# Patient Record
Sex: FEMALE | Race: WHITE | URBAN - METROPOLITAN AREA
[De-identification: names, ages, dates, MRNs, and addresses within clinical notes are randomized per-mention and may not be internally consistent; named-entity substitution may affect disease eponyms.]

---

## 2020-03-11 ENCOUNTER — EMERGENCY (EMERGENCY)
Facility: HOSPITAL | Age: 20
LOS: 1 days | Discharge: HOME | End: 2020-03-11
Admitting: EMERGENCY MEDICINE
Payer: COMMERCIAL

## 2020-03-11 VITALS
HEIGHT: 63 IN | SYSTOLIC BLOOD PRESSURE: 126 MMHG | RESPIRATION RATE: 18 BRPM | TEMPERATURE: 98 F | HEART RATE: 92 BPM | DIASTOLIC BLOOD PRESSURE: 88 MMHG | OXYGEN SATURATION: 98 % | WEIGHT: 149.91 LBS

## 2020-03-11 VITALS
SYSTOLIC BLOOD PRESSURE: 119 MMHG | HEART RATE: 100 BPM | RESPIRATION RATE: 16 BRPM | TEMPERATURE: 98 F | DIASTOLIC BLOOD PRESSURE: 81 MMHG | OXYGEN SATURATION: 97 %

## 2020-03-11 DIAGNOSIS — R05 COUGH: ICD-10-CM

## 2020-03-11 DIAGNOSIS — R50.9 FEVER, UNSPECIFIED: ICD-10-CM

## 2020-03-11 PROBLEM — Z00.00 ENCOUNTER FOR PREVENTIVE HEALTH EXAMINATION: Status: ACTIVE | Noted: 2020-03-11

## 2020-03-11 LAB — HCG UR QL: NEGATIVE — SIGNIFICANT CHANGE UP

## 2020-03-11 PROCEDURE — 99284 EMERGENCY DEPT VISIT MOD MDM: CPT | Mod: 25

## 2020-03-11 PROCEDURE — 71046 X-RAY EXAM CHEST 2 VIEWS: CPT | Mod: 26

## 2020-03-11 RX ORDER — ALBUTEROL 90 UG/1
2.5 AEROSOL, METERED ORAL
Refills: 0 | Status: COMPLETED | OUTPATIENT
Start: 2020-03-11 | End: 2020-03-11

## 2020-03-11 RX ADMIN — ALBUTEROL 2.5 MILLIGRAM(S): 90 AEROSOL, METERED ORAL at 12:00

## 2020-03-11 RX ADMIN — ALBUTEROL 2.5 MILLIGRAM(S): 90 AEROSOL, METERED ORAL at 12:18

## 2020-03-11 RX ADMIN — Medication 60 MILLIGRAM(S): at 12:19

## 2020-03-11 NOTE — ED PROVIDER NOTE - OBJECTIVE STATEMENT
Pt. with h/o bronchitis yearly  on Symbicort and albuterol which she takes when she has a cold. Pt. now  c/o dry cough X 2 wks, albuterol not really helping, cough has been worse over the past 3 days. + runny nose( chronic) sore throat.  + subjected fever,  + chills.  taking Tylenol. Otherwise no n/v/d/c/, no rash, no recent travel, no Ua sx. pt. seen by a pulmonologist  and ent last year in [place], unclear of dx. following work -up. non- smoker.

## 2020-03-11 NOTE — ED PROVIDER NOTE - NSFOLLOWUPINSTRUCTIONS_ED_ALL_ED_FT
Chronic Bronchitis, Adult  Chronic bronchitis is long-lasting inflammation of the tubes that carry air into your lungs (bronchial tubes). This is inflammation that occurs:  On most days of the week.For at least three months at a time.Over a period of two years in a row.When the bronchial tubes are inflamed, they start to produce mucus. The inflammation and buildup of mucus make it more difficult to breathe. Chronic bronchitis is usually a permanent problem. It is one type of chronic obstructive pulmonary disease (COPD). People with chronic bronchitis are more likely to get frequent colds or respiratory infections.  What are the causes?  Chronic bronchitis most often occurs in people who:  Have chronic, severe asthma.Have a history of smoking.Have asthma and smoke.Have certain lung diseases.Have had long-term exposure to certain irritating fumes or chemicals.What are the signs or symptoms?  Symptoms of chronic bronchitis may include:  A cough that brings up mucus (productive cough).Shortness of breath.Loud breathing (wheezing).Chest discomfort.Frequent (recurring) colds or respiratory infections.Certain things can trigger chronic bronchitis symptoms or make them worse, such as:  Infections.Stopping certain medicines.Smoking.Exposure to chemicals.How is this diagnosed?  This condition may be diagnosed based on:  Your symptoms and medical history.A physical exam.A chest X-ray.Lung (pulmonary) function tests.How is this treated?  There is no cure for chronic bronchitis, but treatment can help control your symptoms. Treatment may include:  Using a cool mist vaporizer or humidifier to make it easier to breathe.Drinking more fluids. Drinking more makes your mucus thinner, which may make it easier to breathe.Lifestyle changes, such as eating a healthier diet and getting more exercise.Medicines, such as:  Inhalers to improve air flow in and out of your lungs.Antibiotics to treat any bacterial infections you have, such as:  Lung infection (pneumonia).Sinus infection.A sudden, severe (acute) episode of bronchitis.Oxygen therapy.Preventing infections by keeping up to date on vaccinations, including the pneumonia and flu vaccines.Pulmonary rehabilitation. This is a program that helps you manage your breathing problems and improve your quality of life. It may last for up to 4–12 weeks and may include exercise programs, education, counseling, and treatment support.Follow these instructions at home:  Medicines     Take over-the-counter and prescription medicines only as told by your health care provider.If you were prescribed an antibiotic medicine, take it as told by your health care provider. Do not stop taking the antibiotic even if you start to feel better.Preventing infections     Get vaccinations as told by your health care provider. Make sure you get a flu shot (influenza vaccine) every year.Wash your hands often with soap and water. If soap and water are not available, use hand . Avoid contact with people who have symptoms of a cold or the flu.Managing symptoms        Do not smoke, and avoid secondhand smoke. Exposure to cigarette smoke or irritating chemicals will make bronchitis worse. If you smoke and you need help quitting, ask your health care provider. Quitting smoking will help your lungs heal faster. Use an inhaler, cool mist vaporizer, or humidifier as told by your health care provider.Avoid pollen, dust, animal dander, molds, smoke, and other things that cause shortness of breath or wheezing attacks.Use oxygen therapy at home as directed. Follow instructions from your health care provider about how to use oxygen safely and take precautions to prevent fire. Make sure you never smoke while using oxygen or allow others to smoke in your home.Do not wait to get medical care if you have any concerning symptoms or trouble breathing. Waiting could cause permanent injury and may be life threatening.General instructions     Talk with your health care provider about what activities are safe for you and about possible exercise routines. Regular exercise is very important to help you feel better.Drink enough fluids to keep your urine pale yellow. Keep all follow-up visits as told by your health care provider. This is important.Contact a health care provider if:  You have coughing or shortness of breath that gets worse.You have muscle aches.You have chest pain.Your mucus seems to get thicker.Your mucus changes from clear or white to yellow, green, gray, or bloody.Get help right away if:  Your usual medicines do not stop your wheezing.You have severe difficulty breathing.These symptoms may represent a serious problem that is an emergency. Do not wait to see if the symptoms will go away. Get medical help right away. Call your local emergency services (911 in the [place] Do not drive yourself to the hospital.   Summary  Chronic bronchitis is long-lasting inflammation of the tubes that carry air into your lungs (bronchial tubes).Chronic bronchitis is usually a permanent problem. It is one type of chronic obstructive pulmonary disease (COPD).There is no cure for chronic bronchitis, but treatment can help control your symptoms.Do not smoke, and avoid secondhand smoke. Exposure to cigarette smoke or irritating chemicals will make bronchitis worse.This information is not intended to replace advice given to you by your health care provider. Make sure you discuss any questions you have with your health care provider. Chronic Bronchitis, Adult      Follow up with ENT and Pulmonology as scheduled.     Chronic bronchitis is long-lasting inflammation of the tubes that carry air into your lungs (bronchial tubes). This is inflammation that occurs:  On most days of the week.For at least three months at a time.Over a period of two years in a row.When the bronchial tubes are inflamed, they start to produce mucus. The inflammation and buildup of mucus make it more difficult to breathe. Chronic bronchitis is usually a permanent problem. It is one type of chronic obstructive pulmonary disease (COPD). People with chronic bronchitis are more likely to get frequent colds or respiratory infections.  What are the causes?  Chronic bronchitis most often occurs in people who:  Have chronic, severe asthma.Have a history of smoking.Have asthma and smoke.Have certain lung diseases.Have had long-term exposure to certain irritating fumes or chemicals.What are the signs or symptoms?  Symptoms of chronic bronchitis may include:  A cough that brings up mucus (productive cough).Shortness of breath.Loud breathing (wheezing).Chest discomfort.Frequent (recurring) colds or respiratory infections.Certain things can trigger chronic bronchitis symptoms or make them worse, such as:  Infections.Stopping certain medicines.Smoking.Exposure to chemicals.How is this diagnosed?  This condition may be diagnosed based on:  Your symptoms and medical history.A physical exam.A chest X-ray.Lung (pulmonary) function tests.How is this treated?  There is no cure for chronic bronchitis, but treatment can help control your symptoms. Treatment may include:  Using a cool mist vaporizer or humidifier to make it easier to breathe.Drinking more fluids. Drinking more makes your mucus thinner, which may make it easier to breathe.Lifestyle changes, such as eating a healthier diet and getting more exercise.Medicines, such as:  Inhalers to improve air flow in and out of your lungs.Antibiotics to treat any bacterial infections you have, such as:  Lung infection (pneumonia).Sinus infection.A sudden, severe (acute) episode of bronchitis.Oxygen therapy.Preventing infections by keeping up to date on vaccinations, including the pneumonia and flu vaccines.Pulmonary rehabilitation. This is a program that helps you manage your breathing problems and improve your quality of life. It may last for up to 4–12 weeks and may include exercise programs, education, counseling, and treatment support.Follow these instructions at home:  Medicines     Take over-the-counter and prescription medicines only as told by your health care provider.If you were prescribed an antibiotic medicine, take it as told by your health care provider. Do not stop taking the antibiotic even if you start to feel better.Preventing infections     Get vaccinations as told by your health care provider. Make sure you get a flu shot (influenza vaccine) every year.Wash your hands often with soap and water. If soap and water are not available, use hand . Avoid contact with people who have symptoms of a cold or the flu.Managing symptoms        Do not smoke, and avoid secondhand smoke. Exposure to cigarette smoke or irritating chemicals will make bronchitis worse. If you smoke and you need help quitting, ask your health care provider. Quitting smoking will help your lungs heal faster. Use an inhaler, cool mist vaporizer, or humidifier as told by your health care provider.Avoid pollen, dust, animal dander, molds, smoke, and other things that cause shortness of breath or wheezing attacks.Use oxygen therapy at home as directed. Follow instructions from your health care provider about how to use oxygen safely and take precautions to prevent fire. Make sure you never smoke while using oxygen or allow others to smoke in your home.Do not wait to get medical care if you have any concerning symptoms or trouble breathing. Waiting could cause permanent injury and may be life threatening.General instructions     Talk with your health care provider about what activities are safe for you and about possible exercise routines. Regular exercise is very important to help you feel better.Drink enough fluids to keep your urine pale yellow. Keep all follow-up visits as told by your health care provider. This is important.Contact a health care provider if:  You have coughing or shortness of breath that gets worse.You have muscle aches.You have chest pain.Your mucus seems to get thicker.Your mucus changes from clear or white to yellow, green, gray, or bloody.Get help right away if:  Your usual medicines do not stop your wheezing.You have severe difficulty breathing.These symptoms may represent a serious problem that is an emergency. Do not wait to see if the symptoms will go away. Get medical help right away. Call your local emergency services (911 in the [place] Do not drive yourself to the hospital.   Summary  Chronic bronchitis is long-lasting inflammation of the tubes that carry air into your lungs (bronchial tubes).Chronic bronchitis is usually a permanent problem. It is one type of chronic obstructive pulmonary disease (COPD).There is no cure for chronic bronchitis, but treatment can help control your symptoms.Do not smoke, and avoid secondhand smoke. Exposure to cigarette smoke or irritating chemicals will make bronchitis worse.This information is not intended to replace advice given to you by your health care provider. Make sure you discuss any questions you have with your health care provider.

## 2020-03-11 NOTE — ED ADULT TRIAGE NOTE - CHIEF COMPLAINT QUOTE
[facility] from [facility] for further evaluation of asthma exacerbation x 2 weeks with cough and subjective fevers. was given 2 albuterol tx PTA without relief. +audible wheezing. +O2 sat WNL. took 3 tablets of Tylenol at 6am.

## 2020-03-11 NOTE — ED PROVIDER NOTE - PATIENT PORTAL LINK FT
You can access the FollowMyHealth Patient Portal offered by [facility] by registering at the following website: [email or web address]. By joining [facility]’s FollowMyHealth portal, you will also be able to view your health information using other applications (apps) compatible with our system.

## 2020-03-11 NOTE — ED PROVIDER NOTE - DIAGNOSTIC INTERPRETATION
Interpreted by [facility] chest x-ray, 2 views  Lungs clear, heart shadow normal, bony structures normal, no free air under diaphragm, no PTX

## 2020-03-11 NOTE — ED PROVIDER NOTE - CLINICAL SUMMARY MEDICAL DECISION MAKING FREE TEXT BOX
Pt. with h/o bronchitis yearly  on Symbicort and albuterol which she takes when she has a cold. Pt. now  c/o dry cough X 2 wks, albuterol not really helping, cough has been worse over the past 3 days. + runny nose( chronic) sore throat.  + subjected fever,  + chills.  vss,  wheezing improved after tx during exam. non- toxic appearing, vss, will do chest x- ray , prednisone. re-assess.

## 2020-03-11 NOTE — ED PROVIDER NOTE - ENMT, MLM
Airway patent, Nasal mucosa clear. Mouth with normal mucosa. Throat has no vesicles, no oropharyngeal exudates and uvula is midline TM wnl b/l

## 2020-03-11 NOTE — ED ADULT TRIAGE NOTE - PAIN: PRESENCE, MLM
Partially impaired: cannot see medication labels or newsprint, but can see obstacles in path, and the surrounding layout; can count fingers at arm's length denies pain/discomfort

## 2020-03-12 ENCOUNTER — APPOINTMENT (OUTPATIENT)
Dept: PULMONOLOGY | Facility: CLINIC | Age: 20
End: 2020-03-12
Payer: COMMERCIAL

## 2020-03-12 VITALS — SYSTOLIC BLOOD PRESSURE: 134 MMHG | OXYGEN SATURATION: 99 % | DIASTOLIC BLOOD PRESSURE: 85 MMHG | HEART RATE: 106 BPM

## 2020-03-12 DIAGNOSIS — Z87.891 PERSONAL HISTORY OF NICOTINE DEPENDENCE: ICD-10-CM

## 2020-03-12 DIAGNOSIS — Z83.6 FAMILY HISTORY OF OTHER DISEASES OF THE RESPIRATORY SYSTEM: ICD-10-CM

## 2020-03-12 DIAGNOSIS — K21.9 GASTRO-ESOPHAGEAL REFLUX DISEASE W/OUT ESOPHAGITIS: ICD-10-CM

## 2020-03-12 PROCEDURE — 94060 EVALUATION OF WHEEZING: CPT

## 2020-03-12 PROCEDURE — 94729 DIFFUSING CAPACITY: CPT

## 2020-03-12 PROCEDURE — 94726 PLETHYSMOGRAPHY LUNG VOLUMES: CPT

## 2020-03-12 PROCEDURE — 99204 OFFICE O/P NEW MOD 45 MIN: CPT | Mod: 25

## 2020-03-12 PROCEDURE — ZZZZZ: CPT

## 2020-03-12 RX ORDER — PANTOPRAZOLE 40 MG/1
40 TABLET, DELAYED RELEASE ORAL DAILY
Qty: 45 | Refills: 2 | Status: ACTIVE | COMMUNITY
Start: 2020-03-12 | End: 1900-01-01

## 2020-03-12 RX ORDER — FLUTICASONE PROPIONATE 50 UG/1
50 SPRAY, METERED NASAL TWICE DAILY
Qty: 1 | Refills: 2 | Status: ACTIVE | COMMUNITY
Start: 2020-03-12 | End: 1900-01-01

## 2020-03-12 RX ORDER — ALBUTEROL SULFATE 90 UG/1
108 (90 BASE) AEROSOL, METERED RESPIRATORY (INHALATION)
Refills: 0 | Status: ACTIVE | COMMUNITY

## 2020-03-12 NOTE — HISTORY OF PRESENT ILLNESS
[TextBox_4] : 19 year old female, ex-smoker (smoked for 10 years, quit 2 weeks back) with h/o asthma for last 3 years with deviated nasal septum and chronic allergic rhinitis came to clinic for management of asthma.\par Patient was started on Symbicort 3 years back but patient felt that her symptoms were not controlled with Symbicort and hence she stopped using Symbicort and uses prn Ventolin as needed. Patient moved to [facility] 1 year back and  had multiple episodes of wheezing and SOB since her move to [facility]. Patient went to ED few days back and diagnosed with asthma exacerbation. Patient was started on prednisone and feels better but still c/o nasal congestion, cough and SOB. CXR done in ED did not show pneumonia (as per patient)

## 2020-03-12 NOTE — DISCUSSION/SUMMARY
[FreeTextEntry1] : 19 year old female, ex-smoker (smoked for 10 years, quit 2 weeks back) with h/o asthma for last 3 years with deviated nasal septum and chronic allergic rhinitis came to clinic for management of asthma.\par \par Review:\par PFT (3/20): FEV1 77, FEV1/FVC 66, TLC 96, DLCO 79, Rev -4%\par \par A/P\par Severe Persistent asthma:\par - D/c prednisone after 3 more days \par - Start Breo 1 puff daily\par - Prn Ventolin\par - Singulair and Flonase nasal spray\par - Patient does not want to use nasal irrigation at present\par - Pantoprazole 40 mg daily

## 2020-03-12 NOTE — REVIEW OF SYSTEMS
[Nasal Congestion] : nasal congestion [Postnasal Drip] : postnasal drip [Cough] : cough [Nasal Discharge] : nasal discharge [GERD] : gerd [Fever] : no fever [Chills] : no chills [Dry Eyes] : no dry eyes [Eye Irritation] : no eye irritation [Hemoptysis] : no hemoptysis [Sputum] : no sputum [Hay Fever] : no hay fever [Abdominal Pain] : no abdominal pain [Nausea] : no nausea [Diarrhea] : no diarrhea [Depression] : no depression [Anxiety] : no anxiety [Diabetes] : no diabetes [Thyroid Problem] : no thyroid problem [TextBox_144] : rest of ROS negative

## 2020-03-12 NOTE — PHYSICAL EXAM
[No Acute Distress] : no acute distress [Well Developed] : well developed [Normal Oropharynx] : normal oropharynx [Erythema] : erythema [Normal Appearance] : normal appearance [No JVD] : no jvd [Normal Rate/Rhythm] : normal rate/rhythm [No Murmurs] : no murmurs [No Resp Distress] : no resp distress [Clear to Auscultation Bilaterally] : clear to auscultation bilaterally [Not Tender] : not tender [Soft] : soft [Normal Gait] : normal gait [Gait - Sufficient For Exercise Testing] : gait sufficient for exercise testing [No Clubbing] : no clubbing [No Edema] : no edema [Normal Color/ Pigmentation] : normal color/ pigmentation [No Rash] : no rash [No Focal Deficits] : no focal deficits [No Sensory Deficits] : no sensory deficits [Oriented x3] : oriented x3 [Normal Affect] : normal affect [TextBox_11] : deviated nasal spetum

## 2020-07-16 ENCOUNTER — APPOINTMENT (OUTPATIENT)
Dept: PULMONOLOGY | Facility: CLINIC | Age: 20
End: 2020-07-16
Payer: COMMERCIAL

## 2020-07-16 VITALS
WEIGHT: 154 LBS | DIASTOLIC BLOOD PRESSURE: 104 MMHG | TEMPERATURE: 98.5 F | HEART RATE: 122 BPM | HEIGHT: 63 IN | OXYGEN SATURATION: 97 % | SYSTOLIC BLOOD PRESSURE: 139 MMHG | BODY MASS INDEX: 27.29 KG/M2

## 2020-07-16 DIAGNOSIS — J45.50 SEVERE PERSISTENT ASTHMA, UNCOMPLICATED: ICD-10-CM

## 2020-07-16 DIAGNOSIS — J30.9 ALLERGIC RHINITIS, UNSPECIFIED: ICD-10-CM

## 2020-07-16 PROCEDURE — 99214 OFFICE O/P EST MOD 30 MIN: CPT

## 2020-07-16 RX ORDER — FLUTICASONE FUROATE AND VILANTEROL TRIFENATATE 200; 25 UG/1; UG/1
200-25 POWDER RESPIRATORY (INHALATION)
Qty: 1 | Refills: 3 | Status: ACTIVE | COMMUNITY
Start: 2020-03-12 | End: 1900-01-01

## 2020-07-16 RX ORDER — MONTELUKAST 10 MG/1
10 TABLET, FILM COATED ORAL
Qty: 30 | Refills: 5 | Status: ACTIVE | COMMUNITY
Start: 2020-03-12 | End: 1900-01-01

## 2020-07-16 RX ORDER — AZELASTINE HYDROCHLORIDE 137 UG/1
137 SPRAY, METERED NASAL
Qty: 1 | Refills: 2 | Status: ACTIVE | COMMUNITY
Start: 2020-07-16 | End: 1900-01-01

## 2020-07-17 LAB
BASOPHILS # BLD AUTO: 0.04 K/UL
BASOPHILS NFR BLD AUTO: 0.6 %
EOSINOPHIL # BLD AUTO: 0.18 K/UL
EOSINOPHIL NFR BLD AUTO: 2.7 %
HCT VFR BLD CALC: 39.7 %
HGB BLD-MCNC: 12.5 G/DL
IMM GRANULOCYTES NFR BLD AUTO: 0.3 %
LYMPHOCYTES # BLD AUTO: 1.51 K/UL
LYMPHOCYTES NFR BLD AUTO: 22.8 %
MAN DIFF?: NORMAL
MCHC RBC-ENTMCNC: 29.2 PG
MCHC RBC-ENTMCNC: 31.5 GM/DL
MCV RBC AUTO: 92.8 FL
MONOCYTES # BLD AUTO: 0.45 K/UL
MONOCYTES NFR BLD AUTO: 6.8 %
NEUTROPHILS # BLD AUTO: 4.42 K/UL
NEUTROPHILS NFR BLD AUTO: 66.8 %
PLATELET # BLD AUTO: 278 K/UL
RBC # BLD: 4.28 M/UL
RBC # FLD: 13.1 %
WBC # FLD AUTO: 6.62 K/UL

## 2020-07-20 PROBLEM — J30.9 ALLERGIC RHINITIS: Status: ACTIVE | Noted: 2020-03-12

## 2020-07-20 PROBLEM — J45.50 SEVERE PERSISTENT ASTHMA, UNSPECIFIED WHETHER COMPLICATED: Status: ACTIVE | Noted: 2020-03-12

## 2020-07-20 NOTE — HISTORY OF PRESENT ILLNESS
[TextBox_4] : 19 year old female, ex-smoker (smoked for 10 years, quit 2 weeks back) with h/o asthma for last 3 years with deviated nasal septum and chronic allergic rhinitis came to clinic for management of asthma.\par Patient was started on Symbicort 3 years back but patient felt that her symptoms were not controlled with Symbicort and hence she stopped using Symbicort and uses prn Ventolin as needed. Patient moved to [facility] 1 year back and  had multiple episodes of wheezing and SOB since her move to [facility]. Patient went to ED few days back and diagnosed with asthma exacerbation. Patient was started on prednisone and feels better but still c/o nasal congestion, cough and SOB. CXR done in ED did not show pneumonia (as per patient)\par \par 7/20/20:\par Patient is feeling better than last time. Patient went to her parents house for few weeks recently and she started to complain of SOB, nasal congestion again. No exacerbation since last visit. Complaint to use of Breo.

## 2020-07-20 NOTE — DISCUSSION/SUMMARY
[FreeTextEntry1] : 19 year old female, ex-smoker (smoked for 10 years, quit 2 weeks back) with h/o asthma for last 3 years with deviated nasal septum and chronic allergic rhinitis.\par \par Review:\par PFT (3/20): FEV1 77, FEV1/FVC 66, TLC 96, DLCO 79, Rev -4%\par \par A/P\par Severe Persistent asthma with worsening allergic rhinitis:\par - No exacerbation since last visit. Patient did not use montelukast after last visit and her pharmacy did not have medication (as per patient).\par - Continue Breo 1 puff daily\par - Prn Ventolin\par - Singulair and Flonase nasal spray to continue\par - Add Azelastine nasal spray two times a day\par - Patient still, does not want to use nasal irrigation at present\par - Follow up after 4 weeks

## 2020-07-20 NOTE — REVIEW OF SYSTEMS
[Fever] : no fever [Dry Eyes] : no dry eyes [Chills] : no chills [Nasal Congestion] : nasal congestion [Eye Irritation] : no eye irritation [Cough] : no cough [Hemoptysis] : no hemoptysis [Postnasal Drip] : postnasal drip [Wheezing] : no wheezing [Sputum] : no sputum [Hay Fever] : no hay fever [Nasal Discharge] : nasal discharge [GERD] : no gerd [Abdominal Pain] : no abdominal pain [Nausea] : no nausea [Diarrhea] : no diarrhea [Depression] : no depression [Anxiety] : no anxiety [Diabetes] : no diabetes [TextBox_144] : rest of ROS negative [Thyroid Problem] : no thyroid problem

## 2020-07-21 LAB — TOTAL IGE SMQN RAST: 28 KU/L

## 2020-08-27 ENCOUNTER — APPOINTMENT (OUTPATIENT)
Dept: PULMONOLOGY | Facility: CLINIC | Age: 20
End: 2020-08-27

## 2020-09-08 ENCOUNTER — APPOINTMENT (OUTPATIENT)
Dept: PULMONOLOGY | Facility: CLINIC | Age: 20
End: 2020-09-08